# Patient Record
Sex: FEMALE | Race: BLACK OR AFRICAN AMERICAN | NOT HISPANIC OR LATINO | URBAN - METROPOLITAN AREA
[De-identification: names, ages, dates, MRNs, and addresses within clinical notes are randomized per-mention and may not be internally consistent; named-entity substitution may affect disease eponyms.]

---
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
## 2024-05-28 ENCOUNTER — OFFICE VISIT (OUTPATIENT)
Dept: FAMILY MEDICINE | Facility: CLINIC | Age: 19
End: 2024-05-28
Payer: COMMERCIAL

## 2024-05-28 VITALS
WEIGHT: 114.6 LBS | BODY MASS INDEX: 19.56 KG/M2 | HEIGHT: 64 IN | SYSTOLIC BLOOD PRESSURE: 123 MMHG | RESPIRATION RATE: 17 BRPM | DIASTOLIC BLOOD PRESSURE: 74 MMHG | TEMPERATURE: 97.6 F | OXYGEN SATURATION: 98 % | HEART RATE: 93 BPM

## 2024-05-28 DIAGNOSIS — Z00.00 ROUTINE HISTORY AND PHYSICAL EXAMINATION OF ADULT: ICD-10-CM

## 2024-05-28 DIAGNOSIS — L24.9 IRRITANT CONTACT DERMATITIS, UNSPECIFIED TRIGGER: Primary | ICD-10-CM

## 2024-05-28 LAB
ANION GAP SERPL CALCULATED.3IONS-SCNC: 9 MMOL/L (ref 7–15)
BUN SERPL-MCNC: 12.6 MG/DL (ref 6–20)
CALCIUM SERPL-MCNC: 9.8 MG/DL (ref 8.6–10)
CHLORIDE SERPL-SCNC: 102 MMOL/L (ref 98–107)
CHOLEST SERPL-MCNC: 182 MG/DL
CREAT SERPL-MCNC: 0.56 MG/DL (ref 0.51–0.95)
DEPRECATED HCO3 PLAS-SCNC: 27 MMOL/L (ref 22–29)
EGFRCR SERPLBLD CKD-EPI 2021: >90 ML/MIN/1.73M2
ERYTHROCYTE [DISTWIDTH] IN BLOOD BY AUTOMATED COUNT: 11.7 % (ref 10–15)
FASTING STATUS PATIENT QL REPORTED: NO
FASTING STATUS PATIENT QL REPORTED: NO
GLUCOSE SERPL-MCNC: 115 MG/DL (ref 70–99)
HCT VFR BLD AUTO: 41.4 % (ref 35–47)
HDLC SERPL-MCNC: 66 MG/DL
HGB BLD-MCNC: 13.8 G/DL (ref 11.7–15.7)
LDLC SERPL CALC-MCNC: 109 MG/DL
MCH RBC QN AUTO: 31.1 PG (ref 26.5–33)
MCHC RBC AUTO-ENTMCNC: 33.3 G/DL (ref 31.5–36.5)
MCV RBC AUTO: 93 FL (ref 78–100)
NONHDLC SERPL-MCNC: 116 MG/DL
PLATELET # BLD AUTO: 289 10E3/UL (ref 150–450)
POTASSIUM SERPL-SCNC: 4.6 MMOL/L (ref 3.4–5.3)
RBC # BLD AUTO: 4.44 10E6/UL (ref 3.8–5.2)
SODIUM SERPL-SCNC: 138 MMOL/L (ref 135–145)
TRIGL SERPL-MCNC: 33 MG/DL
WBC # BLD AUTO: 7.2 10E3/UL (ref 4–11)

## 2024-05-28 PROCEDURE — 80048 BASIC METABOLIC PNL TOTAL CA: CPT | Performed by: STUDENT IN AN ORGANIZED HEALTH CARE EDUCATION/TRAINING PROGRAM

## 2024-05-28 PROCEDURE — 99385 PREV VISIT NEW AGE 18-39: CPT | Mod: GC | Performed by: STUDENT IN AN ORGANIZED HEALTH CARE EDUCATION/TRAINING PROGRAM

## 2024-05-28 PROCEDURE — 99213 OFFICE O/P EST LOW 20 MIN: CPT | Mod: 25 | Performed by: STUDENT IN AN ORGANIZED HEALTH CARE EDUCATION/TRAINING PROGRAM

## 2024-05-28 PROCEDURE — 36415 COLL VENOUS BLD VENIPUNCTURE: CPT | Performed by: STUDENT IN AN ORGANIZED HEALTH CARE EDUCATION/TRAINING PROGRAM

## 2024-05-28 PROCEDURE — 80061 LIPID PANEL: CPT | Performed by: STUDENT IN AN ORGANIZED HEALTH CARE EDUCATION/TRAINING PROGRAM

## 2024-05-28 PROCEDURE — 85027 COMPLETE CBC AUTOMATED: CPT | Performed by: STUDENT IN AN ORGANIZED HEALTH CARE EDUCATION/TRAINING PROGRAM

## 2024-05-28 RX ORDER — HYDROCORTISONE 25 MG/G
OINTMENT TOPICAL 2 TIMES DAILY
Qty: 30 G | Refills: 1 | Status: SHIPPED | OUTPATIENT
Start: 2024-05-28

## 2024-05-28 SDOH — HEALTH STABILITY: PHYSICAL HEALTH: ON AVERAGE, HOW MANY MINUTES DO YOU ENGAGE IN EXERCISE AT THIS LEVEL?: 0 MIN

## 2024-05-28 SDOH — HEALTH STABILITY: PHYSICAL HEALTH: ON AVERAGE, HOW MANY DAYS PER WEEK DO YOU ENGAGE IN MODERATE TO STRENUOUS EXERCISE (LIKE A BRISK WALK)?: 0 DAYS

## 2024-05-28 ASSESSMENT — SOCIAL DETERMINANTS OF HEALTH (SDOH): HOW OFTEN DO YOU GET TOGETHER WITH FRIENDS OR RELATIVES?: PATIENT DECLINED

## 2024-05-28 NOTE — PROGRESS NOTES
Preventive Care Visit  [facility] SARAHI Brasher MD, Family Medicine  May 28, 2024    Assessment & Plan     Irritant contact dermatitis, unspecified trigger  Several year history of dry peeling lips, with discoloration over areas of skin peeling.  Suspect possible irritant contact dermatitis versus atopic dermatitis.  Patient was counseled on using fragrance/preservative free lip products.  Will also trial topical hydrocortisone twice daily for 2 weeks.  - hydrocortisone 2.5 % ointment; Apply topically 2 times daily    Routine history of physical examination of adult  - CBC with platelets; Future  - Basic metabolic panel; Future  - Lipid panel; Future  - CBC with platelets  - Basic metabolic panel  - Lipid panel    Counseling  Appropriate preventive services were discussed with this patient, including applicable screening as appropriate for fall prevention, nutrition, physical activity, Tobacco-use cessation, weight loss and cognition.  Checklist reviewing preventive services available has been given to the patient.  Reviewed patient's diet, addressing concerns and/or questions.     Return in about 4 weeks (around 6/25/2024).    Dao Green is a 18 year old, presenting for the following:  Physical    Few year history of peeling lips. Worsens with citrus containing foods.         5/28/2024    10:57 AM   Additional Questions   Roomed by Julia   Accompanied by Mother, sister and brother         5/28/2024    10:57 AM   Patient Reported Additional Medications   Patient reports taking the following new medications n/a         5/28/2024    Information    services provided? Yes   Language Belgian   Type of interpretation provided Face-to-face    name ibado    Agency Other   Agency name [employer]        Health Care Directive  Patient does not have a Health Care Directive or Living Will: Discussed advance care planning with patient; however, patient  declined at this time.    HPI  Primary concern today is dry peeling lips with discoloration over areas of peeling.      5/28/2024   General Health   How would you rate your overall physical health? Good   Feel stress (tense, anxious, or unable to sleep) To some extent   (!) STRESS CONCERN      5/28/2024   Nutrition   Three or more servings of calcium each day? (!) NO   Diet: Regular (no restrictions)   How many servings of fruit and vegetables per day? (!) 2-3   How many sweetened beverages each day? 0-1         5/28/2024   Exercise   Days per week of moderate/strenous exercise 0 days   Average minutes spent exercising at this level 0 min   (!) EXERCISE CONCERN      5/28/2024   Social Factors   Frequency of gathering with friends or relatives Patient declined   Worry food won't last until get money to buy more No    No   Food not last or not have enough money for food? No    No   Do you have housing?  Yes    Yes   Are you worried about losing your housing? No    No   Lack of transportation? No    No   Unable to get utilities (heat,electricity)? No    No         5/28/2024   Dental   Dentist two times every year? Yes         5/28/2024   TB Screening   Were you born outside of the [place]? Yes         Today's PHQ-2 Score:       5/28/2024    11:02 AM   PHQ-2 ( 1999 [employer])   Q1: Little interest or pleasure in doing things 0   Q2: Feeling down, depressed or hopeless 0   PHQ-2 Score 0   Q1: Little interest or pleasure in doing things Not at all   Q2: Feeling down, depressed or hopeless Not at all   PHQ-2 Score 0           5/28/2024   Substance Use   Alcohol more than 3/day or more than 7/wk No   Do you use any other substances recreationally? No     Social History     Tobacco Use    Smoking status: Never    Smokeless tobacco: Never           5/28/2024   STI Screening   New sexual partner(s) since last STI/HIV test? No     History of abnormal Pap smear: No - under age 21, PAP not appropriate for age             5/28/2024  "  Contraception/Family Planning   Questions about contraception or family planning No       Reviewed and updated as needed this visit by Provider     Meds  Problems  Med Hx  Surg Hx  Fam Hx                 Objective    Exam  /74 (BP Location: Left arm, Patient Position: Sitting, Cuff Size: Adult Regular)   Pulse 93   Temp 97.6  F (36.4  C) (Oral)   Resp 17   Ht 1.615 m (5' 3.58\")   Wt 52 kg (114 lb 9.6 oz)   LMP 05/16/2024   SpO2 98%   BMI 19.93 kg/m     Estimated body mass index is 19.93 kg/m  as calculated from the following:    Height as of this encounter: 1.615 m (5' 3.58\").    Weight as of this encounter: 52 kg (114 lb 9.6 oz).    Physical Exam  GENERAL: alert and no distress  NECK: no adenopathy, no asymmetry, masses, or scars  RESP: lungs clear to auscultation - no rales, rhonchi or wheezes  CV: regular rate and rhythm, normal S1 S2, no S3 or S4, no murmur, click or rub, no peripheral edema  ABDOMEN: soft, nontender, no hepatosplenomegaly, no masses and bowel sounds normal  MS: no gross musculoskeletal defects noted, no edema  : Exam declined by parent/patient.  Reason for decline: Patient/Parental preference        Signed Electronically by: Janet Brasher MD    "

## 2024-05-28 NOTE — LETTER
May 28, 2024      Skinnyair Anderson  [address] [address]        Dear ,    We are writing to inform you of your test results.    Your test results fall within the expected range(s) or remain unchanged from previous results.  Please continue with current treatment plan.    Resulted Orders   CBC with platelets   Result Value Ref Range    WBC Count 7.2 4.0 - 11.0 10e3/uL    RBC Count 4.44 3.80 - 5.20 10e6/uL    Hemoglobin 13.8 11.7 - 15.7 g/dL    Hematocrit 41.4 35.0 - 47.0 %    MCV 93 78 - 100 fL    MCH 31.1 26.5 - 33.0 pg    MCHC 33.3 31.5 - 36.5 g/dL    RDW 11.7 10.0 - 15.0 %    Platelet Count 289 150 - 450 10e3/uL   Basic metabolic panel   Result Value Ref Range    Sodium 138 135 - 145 mmol/L      Comment:      Reference intervals for this test were updated on 09/26/2023 to more accurately reflect our healthy population. There may be differences in the flagging of prior results with similar values performed with this method. Interpretation of those prior results can be made in the context of the updated reference intervals.     Potassium 4.6 3.4 - 5.3 mmol/L    Chloride 102 98 - 107 mmol/L    Carbon Dioxide (CO2) 27 22 - 29 mmol/L    Anion Gap 9 7 - 15 mmol/L    Urea Nitrogen 12.6 6.0 - 20.0 mg/dL    Creatinine 0.56 0.51 - 0.95 mg/dL    GFR Estimate >90 >60 mL/min/1.73m2    Calcium 9.8 8.6 - 10.0 mg/dL    Glucose 115 (H) 70 - 99 mg/dL    Patient Fasting > 8hrs? No    Lipid panel   Result Value Ref Range    Cholesterol 182 (H) <170 mg/dL    Triglycerides 33 <=90 mg/dL    Direct Measure HDL 66 >=45 mg/dL    LDL Cholesterol Calculated 109 <=110 mg/dL    Non HDL Cholesterol 116 <120 mg/dL    Patient Fasting > 8hrs? No     Narrative    Cholesterol  Desirable:  <200 mg/dL    Triglycerides  Normal:  Less than 150 mg/dL  Borderline High:  150-199 mg/dL  High:  200-499 mg/dL  Very High:  Greater than or equal to 500 mg/dL    Direct Measure HDL  Female:  Greater than or equal to 50 mg/dL   Male:  Greater  than or equal to 40 mg/dL    LDL Cholesterol  Desirable:  <100mg/dL  Above Desirable:  100-129 mg/dL   Borderline High:  130-159 mg/dL   High:  160-189 mg/dL   Very High:  >= 190 mg/dL    Non HDL Cholesterol  Desirable:  130 mg/dL  Above Desirable:  130-159 mg/dL  Borderline High:  160-189 mg/dL  High:  190-219 mg/dL  Very High:  Greater than or equal to 220 mg/dL       If you have any questions or concerns, please call the clinic at the number listed above.       Sincerely,      Janet Brasher MD

## 2024-05-28 NOTE — PATIENT INSTRUCTIONS
-Avoid using products with  flavors, fragrances, preservatives, propolis, lanolin (aquaphor).   -Use plain vaseline on lips, can combine with hydrocortisone topical twice daily for 2 weeks  -avoid toothpaste with stannous fluoride (use only sodium fluoride containing toothpaste) - sensodyne pure mint (green) is the best option.

## 2025-03-20 ENCOUNTER — OFFICE VISIT (OUTPATIENT)
Dept: FAMILY MEDICINE | Facility: CLINIC | Age: 20
End: 2025-03-20
Payer: COMMERCIAL

## 2025-03-20 VITALS
TEMPERATURE: 98.3 F | RESPIRATION RATE: 16 BRPM | DIASTOLIC BLOOD PRESSURE: 70 MMHG | HEART RATE: 96 BPM | OXYGEN SATURATION: 98 % | WEIGHT: 113 LBS | HEIGHT: 63 IN | BODY MASS INDEX: 20.02 KG/M2 | SYSTOLIC BLOOD PRESSURE: 100 MMHG

## 2025-03-20 DIAGNOSIS — R47.89: ICD-10-CM

## 2025-03-20 DIAGNOSIS — R63.8 DIFFICULTY EATING: Primary | ICD-10-CM

## 2025-03-20 LAB
ALBUMIN SERPL BCG-MCNC: 4.5 G/DL (ref 3.5–5.2)
ALBUMIN UR-MCNC: NEGATIVE MG/DL
ALP SERPL-CCNC: 88 U/L (ref 40–150)
ALT SERPL W P-5'-P-CCNC: 13 U/L (ref 0–50)
APPEARANCE UR: CLEAR
AST SERPL W P-5'-P-CCNC: 17 U/L (ref 0–45)
BACTERIA #/AREA URNS HPF: ABNORMAL /HPF
BASOPHILS # BLD AUTO: 0.1 10E3/UL (ref 0–0.2)
BASOPHILS NFR BLD AUTO: 1 %
BILIRUB DIRECT SERPL-MCNC: 0.11 MG/DL (ref 0–0.3)
BILIRUB SERPL-MCNC: 0.3 MG/DL
BILIRUB UR QL STRIP: NEGATIVE
COLOR UR AUTO: YELLOW
CRP SERPL-MCNC: <3 MG/L
EOSINOPHIL # BLD AUTO: 0.1 10E3/UL (ref 0–0.7)
EOSINOPHIL NFR BLD AUTO: 2 %
ERYTHROCYTE [DISTWIDTH] IN BLOOD BY AUTOMATED COUNT: 11.7 % (ref 10–15)
ERYTHROCYTE [SEDIMENTATION RATE] IN BLOOD BY WESTERGREN METHOD: 11 MM/HR (ref 0–20)
GLUCOSE UR STRIP-MCNC: NEGATIVE MG/DL
HCT VFR BLD AUTO: 41.1 % (ref 35–47)
HGB BLD-MCNC: 13.9 G/DL (ref 11.7–15.7)
HGB UR QL STRIP: ABNORMAL
IMM GRANULOCYTES # BLD: 0 10E3/UL
IMM GRANULOCYTES NFR BLD: 0 %
KETONES UR STRIP-MCNC: NEGATIVE MG/DL
LEUKOCYTE ESTERASE UR QL STRIP: NEGATIVE
LIPASE SERPL-CCNC: 16 U/L (ref 13–60)
LYMPHOCYTES # BLD AUTO: 2.4 10E3/UL (ref 0.8–5.3)
LYMPHOCYTES NFR BLD AUTO: 30 %
MCH RBC QN AUTO: 31.3 PG (ref 26.5–33)
MCHC RBC AUTO-ENTMCNC: 33.8 G/DL (ref 31.5–36.5)
MCV RBC AUTO: 93 FL (ref 78–100)
MONOCYTES # BLD AUTO: 0.3 10E3/UL (ref 0–1.3)
MONOCYTES NFR BLD AUTO: 4 %
NEUTROPHILS # BLD AUTO: 5 10E3/UL (ref 1.6–8.3)
NEUTROPHILS NFR BLD AUTO: 63 %
NITRATE UR QL: NEGATIVE
PH UR STRIP: 7 [PH] (ref 5–8)
PLATELET # BLD AUTO: 293 10E3/UL (ref 150–450)
PROT SERPL-MCNC: 8.1 G/DL (ref 6.4–8.3)
RBC # BLD AUTO: 4.44 10E6/UL (ref 3.8–5.2)
RBC #/AREA URNS AUTO: ABNORMAL /HPF
SP GR UR STRIP: 1.01 (ref 1–1.03)
SQUAMOUS #/AREA URNS AUTO: ABNORMAL /LPF
UROBILINOGEN UR STRIP-ACNC: 0.2 E.U./DL
WBC # BLD AUTO: 7.9 10E3/UL (ref 4–11)
WBC #/AREA URNS AUTO: ABNORMAL /HPF

## 2025-03-20 PROCEDURE — 86140 C-REACTIVE PROTEIN: CPT

## 2025-03-20 PROCEDURE — 85025 COMPLETE CBC W/AUTO DIFF WBC: CPT

## 2025-03-20 PROCEDURE — 85652 RBC SED RATE AUTOMATED: CPT

## 2025-03-20 PROCEDURE — 99214 OFFICE O/P EST MOD 30 MIN: CPT | Mod: GC

## 2025-03-20 PROCEDURE — 3078F DIAST BP <80 MM HG: CPT

## 2025-03-20 PROCEDURE — 81001 URINALYSIS AUTO W/SCOPE: CPT

## 2025-03-20 PROCEDURE — 36415 COLL VENOUS BLD VENIPUNCTURE: CPT

## 2025-03-20 PROCEDURE — 80076 HEPATIC FUNCTION PANEL: CPT

## 2025-03-20 PROCEDURE — 83690 ASSAY OF LIPASE: CPT

## 2025-03-20 PROCEDURE — 3074F SYST BP LT 130 MM HG: CPT

## 2025-03-20 NOTE — PATIENT INSTRUCTIONS
"Patient Education   Here is the plan from today's visit    1. Difficulty eating (Primary)    - Hepatic panel; Future  - CBC with Diff Plt; Future  - Urinalysis, Micro If (UA); Future  - Lipase; Future  - CRP inflammation; Future  - Erythrocyte sedimentation rate auto; Future  - Hepatic panel  - CBC with Diff Plt  - Urinalysis, Micro If (UA)  - Lipase  - CRP inflammation  - Erythrocyte sedimentation rate auto  - Urine Microscopic Exam    2. Difficulty talking    - Adult Mental Health  Referral; Future    3. Posture  What Is OMT (Osteopathic Manipulative Treatment)?    As part of their education, DOs (doctor of osteopathic medicine) receive special training in the musculoskeletal system (your nerves, bones and muscles).     OMT involves using the hands to diagnose, treat and prevent illness or injury.  Using OMT, your osteopathic physician will move your muscles and joints using techniques including stretching, gentle pressure and resistance.     OMT can help people of all ages and backgrounds. In addition to muscle or joint pain, it can be used to treat a variety of conditions such as asthma, sinus pain, migraines and carpal tunnel syndrome.     For more information, please visit [email or web address]    How to Schedule OMT :  Please make an appointment for \"OMT\" with the .  Please inform them you are scheduling for OMT with any DO provider. A list is provided below:     Miladys Easley, DO Loyda Bose, DO  Lois Sun, DO  Jose Pack, DO  Zuleika Haskins, DO  Lisa Yung, DO  Malcolm Flaherty, DO  Batsheva Daniel, DO    Care after OMT:   Similar to other manual therapies, pain can sometimes be increased in the 24 hours after treatment. You can manage this at home by drinking plenty of fluids and using tylenol and/or ibuprofen as needed for pain.   Please return in 2-4 weeks or as directed by your physician today for further osteopathic evaluation.            Please call or return to " clinic if your symptoms don't go away.    Follow up plan  Return in about 2 weeks (around 4/3/2025).    Thank you for coming to [facility] today.  Lab Testing:  **If you had lab testing today and your results are reassuring or normal they will be mailed to you or sent through [email or web address] within 7 days.   **If the lab tests need quick action we will call you with the results.  **If you are having labs done on a different day, please call [phone] to schedule at [facility] or [phone] for other [facility] locations. Labs do not offer walk-in appointments.  The phone number we will call with results is # [phone] (home) [phone] (work). If this is not the best number please call our clinic and change the number.  Medication Refills:  If you need any refills please call your pharmacy and they will contact us.   If you need to  your refill at a new pharmacy, please contact the new pharmacy directly. The new pharmacy will help you get your medications transferred faster.   Scheduling:  If you have any concerns about today's visit or wish to schedule another appointment please call our office during normal business hours [phone] (8-5:00 M-F). If you can no longer make a scheduled visit, please cancel via [email or web address] or call us to cancel.   If a referral was made to an [facility] specialty provider and you do not get a call from central scheduling, please refer to directions on your visit summary or call our office during normal business hours for assistance.   If a Mammogram was ordered for you at the [facility] call [phone] to schedule or change your appointment.  If you had an XRay/CT/Ultrasound/MRI ordered the number is [phone] to schedule or change your radiology appointment.   [facility] has limited ultrasound appointments available on Wednesdays, if you would like your ultrasound at [facility], please call [phone] to schedule.   Medical  Concerns:  If you have urgent medical concerns please call [phone] at any time of the day.    Tatianna Damon MD

## 2025-03-20 NOTE — PROGRESS NOTES
Assessment & Plan     Difficulty eating  Patient has some difficulty with eating (per patient and mother) with minimal weight loss (1lb over ~ 1 year) and benign exam suspect functional disorder or food aversion. However, would benefit from workup for other etiologies of  intraabdominal pathology. Will obtain basic inflammatory, hepatic, and infectious workup as below. Would benefit from H pylori testing as well but declines stool testing.   - UA Macroscopic with reflex to Microscopic and Culture; Future  - CRP inflammation; Future  - Hepatic panel; Future  - CBC with platelets differential; Future  - Basic metabolic panel; Future  - Lipase; Future  - Plan for H pylori brath test at next visit      Difficulty talking  Suspect possible selective mutism (patient sees speech, and has difficulty speaking today) However, I have a concern that anxiety disorder or autism diagnosis may be present - recommend more formal testing for this (referral placed today). ASD commonly results in food and texture aversions, which may be related to above symptoms. Ultimately,  eval is next step.  - Adult Mental Health  Referral; Future        See Patient Instructions    No follow-ups on file.    aDo Green is a 19 year old, presenting for the following health issues:  Anorexia (Lack of appetite ) and Sleep Problem (Unable to sleep )        3/20/2025    11:13 AM   Additional Questions   Roomed by Eleni   Accompanied by sister and mom         3/20/2025    Information    services provided? Yes   Language Croatian   Type of interpretation provided Face-to-face    name Cierakrysten KELLEY    Maternal shares issues with eating, poor weight, depression and sleep  Mother shares that Peter doesn't really talk much  No pain with swallowing  Sometimes hungry but not always  When mother prepares food she only eats a little  Sometimes has early satiety  Stomach does not hurt  Not sick   Not  "vomiting  Mother shares not sure what she thinks the cause is. Thinks maybe stress  Thinks that maybe posture is different and could be related or an issue        Objective    /70   Pulse 96   Temp 98.3  F (36.8  C) (Temporal)   Resp 16   Ht 1.6 m (5' 3\")   Wt 51.3 kg (113 lb)   LMP 03/13/2025 (Approximate)   SpO2 98%   BMI 20.02 kg/m    Body mass index is 20.02 kg/m .  Wt Readings from Last 5 Encounters:   03/20/25 51.3 kg (113 lb)   05/28/24 52 kg (114 lb 9.6 oz) (23%, Z= -0.73)*     * Growth percentiles are based on CDC (Girls, 2-20 Years) data.      Physical Exam   General: No acute distress  Head: No obvious trauma to head.  Ears, Nose, Throat:  External ears normal.  Nose normal.  No pharyngeal erythema, swelling or exudate.  Midline uvula.    Eyes:  Conjunctivae clear.  Pupils are equal, round, and reactive.   Neck: Normal range of motion.  Neck supple.   CV: Regular rate and rhythm.  No murmurs.      Respiratory: Effort normal and breath sounds normal.  No wheezing or crackles.   Gastrointestinal: Soft.  No distension. There is no tenderness.  There is no rigidity, no rebound and no guarding.   Musculoskeletal: Normal range of motion.  Non tender extremities to palpations.    Neuro: Alert. Moving all extremities appropriately.  Speech soft, halting, answers 1-2 words at a time. Can answer yes/no questions.     Skin: Skin is warm and dry.  No rash noted.   Psych: Anxious mood and affect. Appears uncomfortable and anxious.           Signed Electronically by: Tatianna Damon MD    "

## 2025-03-20 NOTE — LETTER
March 21, 2025      Peter Anderson  [address] [address]        Dear ,    We are writing to inform you of your test results.    Your results need further followup. Please call the clinic to make an appointment to discuss your results. Israeli translation :[email or web address] wahl [email or web address].     Here are your results as you can see lab results reveal overall normal results but some blood in the urine which can be related to menses or dehydration. My suggested plan is recheck a urine test at next visit.       Resulted Orders   Hepatic panel   Result Value Ref Range    Protein Total 8.1 6.4 - 8.3 g/dL    Albumin 4.5 3.5 - 5.2 g/dL    Bilirubin Total 0.3 <=1.2 mg/dL    Alkaline Phosphatase 88 40 - 150 U/L    AST 17 0 - 45 U/L    ALT 13 0 - 50 U/L    Bilirubin Direct 0.11 0.00 - 0.30 mg/dL   Urinalysis, Micro If (UA)   Result Value Ref Range    Color Urine Yellow Colorless, Straw, Light Yellow, Yellow    Appearance Urine Clear Clear    Glucose Urine Negative Negative mg/dL    Bilirubin Urine Negative Negative    Ketones Urine Negative Negative mg/dL    Specific Gravity Urine 1.015 1.005 - 1.030    Blood Urine Trace (A) Negative    pH Urine 7.0 5.0 - 8.0    Protein Albumin Urine Negative Negative mg/dL    Urobilinogen Urine 0.2 0.2, 1.0 E.U./dL    Nitrite Urine Negative Negative    Leukocyte Esterase Urine Negative Negative   Lipase   Result Value Ref Range    Lipase 16 13 - 60 U/L   CRP inflammation   Result Value Ref Range    CRP Inflammation <3.00 <5.00 mg/L   Erythrocyte sedimentation rate auto   Result Value Ref Range    Erythrocyte Sedimentation Rate 11 0 - 20 mm/hr   CBC with platelets and differential   Result Value Ref Range    WBC Count 7.9 4.0 - 11.0 10e3/uL    RBC Count 4.44 3.80 - 5.20 10e6/uL    Hemoglobin 13.9 11.7 - 15.7 g/dL    Hematocrit 41.1 35.0 - 47.0 %    MCV 93 78 - 100 fL    MCH 31.3 26.5 - 33.0 pg    MCHC 33.8 31.5 - 36.5 g/dL    RDW  11.7 10.0 - 15.0 %    Platelet Count 293 150 - 450 10e3/uL    % Neutrophils 63 %    % Lymphocytes 30 %    % Monocytes 4 %    % Eosinophils 2 %    % Basophils 1 %    % Immature Granulocytes 0 %    Absolute Neutrophils 5.0 1.6 - 8.3 10e3/uL    Absolute Lymphocytes 2.4 0.8 - 5.3 10e3/uL    Absolute Monocytes 0.3 0.0 - 1.3 10e3/uL    Absolute Eosinophils 0.1 0.0 - 0.7 10e3/uL    Absolute Basophils 0.1 0.0 - 0.2 10e3/uL    Absolute Immature Granulocytes 0.0 <=0.4 10e3/uL   Urine Microscopic Exam   Result Value Ref Range    Bacteria Urine None Seen None Seen /HPF    RBC Urine None Seen 0-2 /HPF /HPF    WBC Urine 0-5 0-5 /HPF /HPF    Squamous Epithelials Urine Moderate (A) None Seen /LPF       If you have any questions or concerns, please call the clinic at the number listed above.       Sincerely,      Tatianna Damon MD     Electronically signed

## 2025-03-25 NOTE — PROGRESS NOTES
Preceptor Attestation:   Patient seen, evaluated and discussed with the resident. I have verified the content of the note, which accurately reflects my assessment of the patient and the plan of care.   Supervising Physician:  Loyda Bose, DO